# Patient Record
Sex: FEMALE | Race: WHITE | NOT HISPANIC OR LATINO | Employment: PART TIME | URBAN - METROPOLITAN AREA
[De-identification: names, ages, dates, MRNs, and addresses within clinical notes are randomized per-mention and may not be internally consistent; named-entity substitution may affect disease eponyms.]

---

## 2018-07-08 ENCOUNTER — HISTORICAL (OUTPATIENT)
Dept: EMERGENCY MEDICINE | Facility: HOSPITAL | Age: 68
End: 2018-07-08

## 2022-04-28 NOTE — ED PROVIDER NOTES
"   Patient:   Lanette Rankin             MRN: [id number]            FIN: [id number]               Age:   67 years     Sex:  Female     :  1950   Associated Diagnoses:   Esophageal obstruction due to food impaction   Author:   Rubén TREJO, Jakub HART      Basic Information   Time seen: Date & time 2018 12:35:00.   History source: Patient.   Arrival mode: Private vehicle.   History limitation: None.   Additional information: Chief Complaint from Nursing Triage Note : Chief Complaint   2018 12:37 CDT       Chief Complaint           Chest pain and son x 1.5 hours that pt states started while she was eating pancakes and sausage. Pt states she feels better after she vomits and is having difficulty swallowing saliva.  .      History of Present Illness   The patient presents with 66y/o F presents to the ED with complaints of SOB/CP after eating x 1 hour.. States pain is relieved after vomiting. States every time she attempts to drink water it comes back up.  Sheldon fnp-c and     I, Dr. Montana, assumed care of this patient at 1255.  68 y/o C female presents to the ED, c/o SOB and chest pain onset 1.5 hours ago. Pt states she began to have chest pain, SOB, and dysphagia after eating some pancakes and sausage. Pt notes the pain is relieved by forced emesis of phlegm. Pt adds that her "whole family" has a dysphagia problem. .  The onset was 2018 11:00:00 .  The course/duration of symptoms is constant.  Location: Central chest. Radiating pain: none. The character of symptoms is   "pain".  The degree at onset was moderate.  The degree at maximum was moderate.  The degree at present is moderate.  The exacerbating factor is none.  The relieving factor is   forced emesis.  Risk factors consist of none.  Prior episodes: none.  Therapy today None.  Associated symptoms: shortness of breath.        Review of Systems   Constitutional symptoms:  Negative except as documented in HPI.   Skin symptoms:  Negative " except as documented in HPI.   Eye symptoms:  Negative except as documented in HPI.   ENMT symptoms:  Negative except as documented in HPI.   Respiratory symptoms:  Shortness of breath.   Cardiovascular symptoms:  Chest pain, diffuse, moderate pain.    Gastrointestinal symptoms:  Vomiting.   Musculoskeletal symptoms:  Negative except as documented in HPI.   Neurologic symptoms:  Negative except as documented in HPI.   Hematologic/Lymphatic symptoms:  Negative except as documented in HPI.             Additional review of systems information: All other systems reviewed and otherwise negative.      Health Status   Allergies:    No active allergies have been recorded..   Medications:  (Selected)   Inpatient Medications  Ordered  Sodium Chloride 0.9% 1000mL 1,000 mL: 1,000 mL, 1,000 mL, IV, 500 mL/hr, start date 07/08/18 12:58:00 CDT.      Past Medical/ Family/ Social History   Medical history:    No active or resolved past medical history items have been selected or recorded..   Surgical history:    No active procedure history items have been selected or recorded..   Family history:    No family history items have been selected or recorded..   Social history:    Social & Psychosocial Habits    No Data Available  .      Physical Examination               Vital Signs   Vital Signs   7/8/2018 13:01 CDT       SpO2                      98 %    7/8/2018 12:37 CDT       Temperature Oral          36.8 DegC                             Temperature Oral (calculated)             98.24 DegF                             Peripheral Pulse Rate     103 bpm  HI                             Respiratory Rate          18 br/min                             SpO2                      100 %                             Oxygen Therapy            Room air                             Systolic Blood Pressure   195 mmHg  HI                             Diastolic Blood Pressure  98 mmHg  HI  .   Measurements   7/8/2018 12:37 CDT       Weight Dosing              60.5 kg                             Weight Measured and Calculated in Lbs     133.38 lb                             Weight Estimated          60.5 kg                             Height/Length Dosing      162 cm                             Height/Length Estimated   162 cm                             Body Mass Index Estimated 23.05 kg/m2  .   Basic Oxygen Information   7/8/2018 13:01 CDT       SpO2                      98 %    7/8/2018 12:37 CDT       SpO2                      100 %                             Oxygen Therapy            Room air  .   General:  Alert, no acute distress,   appears uncomfortable.    Skin:  Warm, dry.    Head:  Normocephalic, atraumatic.    Neck:  Supple, trachea midline, no JVD, no carotid bruit.    Eye:  Pupils are equal, round and reactive to light, extraocular movements are intact, normal conjunctiva, vision unchanged.    Ears, nose, mouth and throat:  Tympanic membranes clear, oral mucosa moist.    Cardiovascular:  Regular rate and rhythm, No murmur, Normal peripheral perfusion.    Respiratory:  Lungs are clear to auscultation, breath sounds are equal.    Gastrointestinal:  Soft, Nontender, Non distended, Normal bowel sounds,   occasionally spits up saliva/phlegm.    Back:  Nontender, Normal range of motion.    Musculoskeletal:  Normal ROM, normal strength, no tenderness, no swelling.    Neurological:  Alert and oriented to person, place, time, and situation, No focal neurological deficit observed, CN II-XII intact, normal sensory observed, normal motor observed, normal coordination observed, Speech: Normal.    Lymphatics:  No lymphadenopathy.   Psychiatric:  Cooperative, appropriate mood & affect.       Medical Decision Making   Documents reviewed:  Emergency department nurses' notes.   Orders    Laboratory    CBC w/ Auto Master AngeloP-C, Beverley MERINO, 07/08/18, 12:37, Completed    POC Istat ER Troponin, ER, Physician, 07/08/18, 13:15, Completed    Automated DiffMaster  MELISSA Beverley MERINO, 07/08/18, 13:21, Completed    CMP, Master MELISSA, Beverley MERINO, 07/08/18, 12:37, Ordered  Xray    XR Chest 2 Views, Master MELISSA Beverley ANGELIQUE, 07/08/18, 12:37, Ordered  EKG / Respiratory / Ancillary    EKG Adult 12 Lead, Master MELISSABeverley, 07/08/18, 12:37, Completed .   Electrocardiogram:  Time 7/8/2018 12:36:00, rate 81, normal sinus rhythm, No ST-T changes, no ectopy, normal KS & QRS intervals, EP Interp,   Low voltages.    Results review:  Lab results : Lab View   7/8/2018 13:18 CDT       WBC                       7.1 x10(3)/mcL                             RBC                       4.20 x10(6)/mcL                             Hgb                       13.1 gm/dL                             Hct                       39.8 %                             Platelet                  247 x10(3)/mcL                             MCV                       94.8 fL  HI                             MCH                       31.2 pg  HI                             MCHC                      32.9 gm/dL  LOW                             RDW                       12.5 %                             MPV                       8.8 fL  LOW                             Abs Neut                  4.82 x10(3)/mcL                             Neutro Auto               68 %  NA                             Lymph Auto                22 %  NA                             Mono Auto                 8 %  NA                             Eos Auto                  1 %  NA                             Abs Eos                   0.0 x10(3)/mcL                             Basophil Auto             1 %  NA                             Abs Neutro                4.82 x10(3)/mcL                             Abs Lymph                 1.6 x10(3)/mcL                             Abs Mono                  0.6 x10(3)/mcL                             Abs Baso                  0.0 x10(3)/mcL    7/8/2018 12:59 CDT       POC Troponin              0.00 ng/mL   .      Reexamination/ Reevaluation   Patient workup unremarkable, we'll speak with GI about foreign body removal.    1430went to talk to patient about planning she states she feels better, feels like the food is passed, no further retching or shortness of breath.    Patient tolerated by mouth challenge discharge home with outpatient follow-up      Impression and Plan   Diagnosis   Esophageal obstruction due to food impaction (ACU28-EO K22.2)   Plan   Condition: Improved.    Disposition: Discharged: Time  7/8/2018 14:55:00, to home.    Patient was given the following educational materials: Esophageal Stricture.    Follow up with: [facility] Within 2 to 4 weeks For upper endoscopy; Report to Emergency Department if symptoms return or worsen.    Counseled: Patient, Regarding diagnosis, Regarding diagnostic results, Regarding treatment plan, Patient indicated understanding of instructions.    Notes: I, Jakub Holguin, acted solely as a scribe for and in the presence of Dr. Montana who performed the service., I  personally performed all of the above services as recorded in this chart.

## 2023-06-05 ENCOUNTER — HOSPITAL ENCOUNTER (EMERGENCY)
Facility: HOSPITAL | Age: 73
Discharge: HOME | End: 2023-06-05
Attending: EMERGENCY MEDICINE
Payer: MEDICARE

## 2023-06-05 VITALS
DIASTOLIC BLOOD PRESSURE: 69 MMHG | HEIGHT: 63 IN | BODY MASS INDEX: 23.04 KG/M2 | SYSTOLIC BLOOD PRESSURE: 162 MMHG | TEMPERATURE: 97 F | HEART RATE: 81 BPM | WEIGHT: 130 LBS | RESPIRATION RATE: 18 BRPM | OXYGEN SATURATION: 100 %

## 2023-06-05 DIAGNOSIS — K57.32 SIGMOID DIVERTICULITIS: ICD-10-CM

## 2023-06-05 DIAGNOSIS — K40.90 RIGHT INGUINAL HERNIA: Primary | ICD-10-CM

## 2023-06-05 DIAGNOSIS — R03.0 ELEVATED BLOOD-PRESSURE READING WITHOUT DIAGNOSIS OF HYPERTENSION: ICD-10-CM

## 2023-06-05 LAB
ALBUMIN SERPL-MCNC: 4 G/DL (ref 3.4–4.8)
ALBUMIN/GLOB SERPL: 1.2 RATIO (ref 1.1–2)
ALP SERPL-CCNC: 80 UNIT/L (ref 40–150)
ALT SERPL-CCNC: 9 UNIT/L (ref 0–55)
APPEARANCE UR: CLEAR
AST SERPL-CCNC: 23 UNIT/L (ref 5–34)
BACTERIA #/AREA URNS AUTO: NORMAL /HPF
BASOPHILS # BLD AUTO: 0.03 X10(3)/MCL
BASOPHILS NFR BLD AUTO: 0.2 %
BILIRUB UR QL STRIP.AUTO: NEGATIVE MG/DL
BILIRUBIN DIRECT+TOT PNL SERPL-MCNC: 0.6 MG/DL
BUN SERPL-MCNC: 17.7 MG/DL (ref 9.8–20.1)
CALCIUM SERPL-MCNC: 8.9 MG/DL (ref 8.4–10.2)
CHLORIDE SERPL-SCNC: 109 MMOL/L (ref 98–107)
CO2 SERPL-SCNC: 21 MMOL/L (ref 23–31)
COLOR UR: YELLOW
CREAT SERPL-MCNC: 0.72 MG/DL (ref 0.55–1.02)
EOSINOPHIL # BLD AUTO: 0.06 X10(3)/MCL (ref 0–0.9)
EOSINOPHIL NFR BLD AUTO: 0.5 %
ERYTHROCYTE [DISTWIDTH] IN BLOOD BY AUTOMATED COUNT: 12.6 % (ref 11.5–17)
GFR SERPLBLD CREATININE-BSD FMLA CKD-EPI: >60 MLS/MIN/1.73/M2
GLOBULIN SER-MCNC: 3.4 GM/DL (ref 2.4–3.5)
GLUCOSE SERPL-MCNC: 110 MG/DL (ref 82–115)
GLUCOSE UR QL STRIP.AUTO: NEGATIVE MG/DL
HCT VFR BLD AUTO: 38.2 % (ref 37–47)
HGB BLD-MCNC: 12.7 G/DL (ref 12–16)
IMM GRANULOCYTES # BLD AUTO: 0.06 X10(3)/MCL (ref 0–0.04)
IMM GRANULOCYTES NFR BLD AUTO: 0.5 %
KETONES UR QL STRIP.AUTO: ABNORMAL MG/DL
LEUKOCYTE ESTERASE UR QL STRIP.AUTO: NEGATIVE UNIT/L
LIPASE SERPL-CCNC: 22 U/L
LYMPHOCYTES # BLD AUTO: 1.49 X10(3)/MCL (ref 0.6–4.6)
LYMPHOCYTES NFR BLD AUTO: 12.2 %
MCH RBC QN AUTO: 31.5 PG (ref 27–31)
MCHC RBC AUTO-ENTMCNC: 33.2 G/DL (ref 33–36)
MCV RBC AUTO: 94.8 FL (ref 80–94)
MONOCYTES # BLD AUTO: 0.95 X10(3)/MCL (ref 0.1–1.3)
MONOCYTES NFR BLD AUTO: 7.8 %
NEUTROPHILS # BLD AUTO: 9.61 X10(3)/MCL (ref 2.1–9.2)
NEUTROPHILS NFR BLD AUTO: 78.8 %
NITRITE UR QL STRIP.AUTO: NEGATIVE
NRBC BLD AUTO-RTO: 0 %
PH UR STRIP.AUTO: 6 [PH]
PLATELET # BLD AUTO: 299 X10(3)/MCL (ref 130–400)
PMV BLD AUTO: 9 FL (ref 7.4–10.4)
POTASSIUM SERPL-SCNC: 3.9 MMOL/L (ref 3.5–5.1)
PROT SERPL-MCNC: 7.4 GM/DL (ref 5.8–7.6)
PROT UR QL STRIP.AUTO: NEGATIVE MG/DL
RBC # BLD AUTO: 4.03 X10(6)/MCL (ref 4.2–5.4)
RBC #/AREA URNS AUTO: <5 /HPF
RBC UR QL AUTO: ABNORMAL UNIT/L
SODIUM SERPL-SCNC: 139 MMOL/L (ref 136–145)
SP GR UR STRIP.AUTO: 1.02 (ref 1–1.03)
SQUAMOUS #/AREA URNS AUTO: <5 /HPF
UROBILINOGEN UR STRIP-ACNC: 0.2 MG/DL
WBC # SPEC AUTO: 12.2 X10(3)/MCL (ref 4.5–11.5)
WBC #/AREA URNS AUTO: <5 /HPF

## 2023-06-05 PROCEDURE — 99285 EMERGENCY DEPT VISIT HI MDM: CPT | Mod: 25

## 2023-06-05 PROCEDURE — 81001 URINALYSIS AUTO W/SCOPE: CPT | Performed by: EMERGENCY MEDICINE

## 2023-06-05 PROCEDURE — 83690 ASSAY OF LIPASE: CPT | Performed by: EMERGENCY MEDICINE

## 2023-06-05 PROCEDURE — 80053 COMPREHEN METABOLIC PANEL: CPT | Performed by: EMERGENCY MEDICINE

## 2023-06-05 PROCEDURE — 85025 COMPLETE CBC W/AUTO DIFF WBC: CPT | Performed by: EMERGENCY MEDICINE

## 2023-06-05 PROCEDURE — 25500020 PHARM REV CODE 255: Performed by: EMERGENCY MEDICINE

## 2023-06-05 RX ORDER — NAPROXEN 500 MG/1
500 TABLET ORAL 2 TIMES DAILY PRN
Qty: 20 TABLET | Refills: 0 | Status: SHIPPED | OUTPATIENT
Start: 2023-06-05 | End: 2023-07-06

## 2023-06-05 RX ORDER — AMOXICILLIN AND CLAVULANATE POTASSIUM 875; 125 MG/1; MG/1
1 TABLET, FILM COATED ORAL 2 TIMES DAILY
Qty: 20 TABLET | Refills: 0 | Status: SHIPPED | OUTPATIENT
Start: 2023-06-05 | End: 2023-06-15

## 2023-06-05 RX ADMIN — IOPAMIDOL 100 ML: 755 INJECTION, SOLUTION INTRAVENOUS at 10:06

## 2023-06-05 NOTE — DISCHARGE INSTRUCTIONS
Call Dr. Bailey's office in the morning. Let them know you were in the ER, the doctor spoke with Dr. Bailey about following up and a referral was placed.  He said they would get you in urgently for a follow up in a few weeks.  I also placed a referral to a surgeon, call their office in the next day or so and say you were in the er and a hernia was found and they placed a referral to surgery for you.  In the meantime, please establish with a primary care provider of your choice for routine outpatient care

## 2023-06-05 NOTE — ED PROVIDER NOTES
"Encounter Date: 6/5/2023    SCRIBE #1 NOTE: I, Vicky Ocampo, am scribing for, and in the presence of,  Milly Barron MD. I have scribed the following portions of the note - Other sections scribed: HPI, ROS, PE.     History     Chief Complaint   Patient presents with    Abdominal Pain     Pt. Reports "a lump" R lower abdomen x 1 years, c/o lower abdominal pain radiating down R leg since yesterday .     72 year old female presents to ED complaining of abdominal pain.  Pt says that she has had a painless mass on her RLQ for about a year.  She says that last night she had nausea, abdominal pain and cramping, and fever.  She says that she cannot walk normally, because of pain that shoots down her right leg.  She denies any recent falls and says she does not take any medication.  She says her bowel movements and urination have been normal.    The history is provided by the patient. No  was used.   Abdominal Pain  The current episode started yesterday. The onset of the illness was abrupt. The problem has not changed since onset.The abdominal pain is located in the RLQ. The abdominal pain radiates to the right leg. The other symptoms of the illness include nausea. The other symptoms of the illness do not include fever, fatigue, shortness of breath, vomiting, diarrhea or dysuria.   Symptoms associated with the illness do not include constipation or back pain.   Review of patient's allergies indicates:   Allergen Reactions    Sulfa (sulfonamide antibiotics)      "I dont know my Mom told me"     No past medical history on file.  No past surgical history on file.  No family history on file.     Review of Systems   Constitutional:  Negative for fatigue, fever and unexpected weight change.   HENT:  Negative for congestion and rhinorrhea.    Eyes:  Negative for pain.   Respiratory:  Negative for chest tightness, shortness of breath and wheezing.    Cardiovascular:  Negative for chest pain. "   Gastrointestinal:  Positive for abdominal pain and nausea. Negative for constipation, diarrhea and vomiting.   Genitourinary:  Negative for dysuria.   Musculoskeletal:  Negative for back pain and neck pain.   Skin:  Negative for rash.   Allergic/Immunologic: Negative for environmental allergies, food allergies and immunocompromised state.   Neurological:  Negative for dizziness and speech difficulty.   Hematological:  Does not bruise/bleed easily.   Psychiatric/Behavioral:  Negative for sleep disturbance and suicidal ideas.      Physical Exam     Initial Vitals [06/05/23 0829]   BP Pulse Resp Temp SpO2   (!) 161/89 96 20 96.8 °F (36 °C) 96 %      MAP       --         Physical Exam    Nursing note and vitals reviewed.  Constitutional: She appears well-developed and well-nourished. She is not diaphoretic. No distress.   HENT:   Head: Normocephalic and atraumatic.   Nose: Nose normal.   Mouth/Throat: Oropharynx is clear and moist.   Eyes: Conjunctivae and EOM are normal. Pupils are equal, round, and reactive to light.   Neck: Trachea normal. Neck supple.   Normal range of motion.  Cardiovascular:  Normal rate, regular rhythm, normal heart sounds and intact distal pulses.           No murmur heard.  Pulmonary/Chest: Breath sounds normal. No respiratory distress. She has no wheezes. She has no rhonchi. She has no rales. She exhibits no tenderness.   Abdominal: Abdomen is soft. Bowel sounds are normal. She exhibits no distension. There is no abdominal tenderness.   Rounded non tender mass to her right lower abdomen that is only palpable when she is standing. There is no rebound and no guarding.   Musculoskeletal:         General: No tenderness or edema. Normal range of motion.      Cervical back: Normal range of motion and neck supple.      Lumbar back: Normal. Normal range of motion.     Neurological: She is alert and oriented to person, place, and time. She has normal strength. No cranial nerve deficit or sensory  deficit.   Skin: Skin is warm and dry. Capillary refill takes less than 2 seconds. No abscess noted. No erythema. No pallor.   Psychiatric: She has a normal mood and affect. Her behavior is normal. Judgment and thought content normal.       ED Course   Procedures  Labs Reviewed   COMPREHENSIVE METABOLIC PANEL - Abnormal; Notable for the following components:       Result Value    Chloride 109 (*)     Carbon Dioxide 21 (*)     All other components within normal limits   URINALYSIS, REFLEX TO URINE CULTURE - Abnormal; Notable for the following components:    Ketones, UA 1+ (*)     Blood, UA Trace (*)     All other components within normal limits   CBC WITH DIFFERENTIAL - Abnormal; Notable for the following components:    WBC 12.20 (*)     RBC 4.03 (*)     MCV 94.8 (*)     MCH 31.5 (*)     Neut # 9.61 (*)     IG# 0.06 (*)     All other components within normal limits   LIPASE - Normal   URINALYSIS, MICROSCOPIC - Normal   CBC W/ AUTO DIFFERENTIAL    Narrative:     The following orders were created for panel order CBC W/ AUTO DIFFERENTIAL.  Procedure                               Abnormality         Status                     ---------                               -----------         ------                     CBC with Differential[[id number]]        Abnormal            Final result                 Please view results for these tests on the individual orders.          Imaging Results              US Abdomen Limited (Final result)  Result time 06/05/23 15:34:28      Final result by Meche Doan MD (06/05/23 15:34:28)                   Impression:      Large cyst at the right lobe of the liver superiorly.  Smaller lesions seen on CT exam not appreciable on this sonogram.    Nonobstructing right renal calculus.      Electronically signed by: Meche Doan  Date:    06/05/2023  Time:    15:34               Narrative:    EXAMINATION:  US ABDOMEN LIMITED    CLINICAL HISTORY:  liver cysts on CT, us  recommended;    TECHNIQUE:  Limited ultrasound of the right upper quadrant of the abdomen was performed.    COMPARISON:  Prior CT abdomen pelvis 06/05/2023    FINDINGS:  LIVER: 14 cm cranial caudal at the midclavicular line. Coarsened echotexture.  Cyst near the dome of the liver corresponding 6 cm cyst seen on CT.  Smaller lesion seen on CT not appreciable on this sonogram.  Portal vein is patent with appropriate directional flow.    PANCREAS: The visualized portions of pancreas appear normal.    GALLBLADDER: No shadowing calculi, wall thickening, or pericholecystic fluid.    BILE DUCTS: 3 mm common bile duct.  Distal common bile duct is obscured by shadowing bowel gas.    INFERIOR VENA CAVA: Normal in appearance.    RIGHT KIDNEY: 9.6 cm. No hydronephrosis.  Echogenic spectral reflector at the mid right kidney measures 3 mm compatible with nonobstructing calculus.    OTHER: No ascites.                                        CT Abdomen Pelvis With Contrast (Final result)  Result time 06/05/23 11:10:15      Final result by Thomas Contreras MD (06/05/23 11:10:15)                   Impression:      Wall thickening with associated inflammatory changes seen in the sigmoid colon concerning for acute diverticulitis.  Underlying colonic mass cannot be completely excluded and follow-up is recommended to resolution.  Some associated punctate subcentimeter lymphadenopathy seen adjacent to the sigmoid.    Right inguinal hernia containing a loop of small bowel but no obstruction is seen    Hypoattenuated areas in the liver most consistent with cysts.  The largest lesion in the dome of the liver appears to have a slightly thickened wall on some of the images.  Additional correlation with ultrasound or CT scan liver mass protocol is recommended    Hiatal hernia    This report was flagged in Epic as abnormal.      Electronically signed by: Tohmas Contreras  Date:    06/05/2023  Time:    11:10               Narrative:     EXAMINATION:  CT ABDOMEN PELVIS WITH CONTRAST    CLINICAL HISTORY:  RLQ abdominal pain (Age >= 14y);RLQ pain and a right lower quadrant mass/lump;    TECHNIQUE:  Low dose axial images, sagittal and coronal reformations were obtained from the lung bases to the pubic symphysis following the IV administration of contrast. Automatic exposure control (AEC) is utilized to reduce patient radiation exposure.    COMPARISON:  None.    FINDINGS:  The lung bases are clear.    The liver is normal in size.  There are multiple hypoattenuated areas seen scattered in the liver which likely represent cysts.  The largest area seen in the dome of the liver but is seems to have some associated peripheral wall thickening especially as seen on images number 14 and 13 on series 2 and image 32 on series 6.  CT scan liver mass protocol may be beneficial for correlation.  Ultrasound could also be performed for correlation.  Portal and hepatic veins appear normal.    There is a hiatal hernia.    The gallbladder appears normal.  No obvious gallstones are seen.  No biliary dilatation is seen.  No pericholecystic fluid is seen.    The pancreas appears normal.  No pancreatic mass or lesion is seen.    The spleen shows no acute abnormality.    The adrenal glands appear normal.  No adrenal nodule is seen.    The kidneys appear normal.  No hydronephrosis is seen.  No hydroureter is seen.  No nephrolithiasis is seen.  No obvious ureteral stones are seen.    Urinary bladder appears grossly unremarkable.    There is some colonic wall thickening with some associated inflammatory changes in the sigmoid colon.  Findings are concerning for diverticulitis.  Underlying mass cannot be excluded.  There are some punctate lymph nodes seen associated with the abnormality in the sigmoid colon.    There is an inguinal hernia on the right side containing a loop of small bowel.  No obvious obstruction is seen.  The appendix is seen and appears unremarkable..    No  free air is seen.  No free fluid is seen.                                       Medications   iopamidoL (ISOVUE-370) injection 100 mL (100 mLs Intravenous Given 6/5/23 1056)     Medical Decision Making:   Initial Assessment:   See hpi  Clinical Tests:   Lab Tests: Ordered and Reviewed  The following lab test(s) were unremarkable: CMP and Urinalysis  Radiological Study: Ordered and Reviewed  Other:   I have discussed this case with another health care provider.        Scribe Attestation:   Scribe #1: I performed the above scribed service and the documentation accurately describes the services I performed. I attest to the accuracy of the note.  Comments: Attending:   Physician Attestation Statement for Scribe #1: IMilly MD, personally performed the services described in this documentation. All medical record entries made by the scribe were at my direction and in my presence.  I have reviewed the chart and agree that the record reflects my personal performance and is accurate and complete.        Attending Attestation:           Physician Attestation for Scribe:  Physician Attestation Statement for Scribe #1: IMilly MD, reviewed documentation, as scribed by Vicky Ocampo in my presence, and it is both accurate and complete.       Medical Decision Making  Differential diagnosis includes, but is not limited to: cyst, mass, abscess, appendicitis, UTI  CBC, CMP, urinalysis and CT scan ordered and reviewed.  Ultrasound also ordered and reviewed.  Labs only notable for mild leukocytosis.  CT with an inguinal hernia but was easily reducible on exam and also some diverticulitis but could not rule out an underlying mass lesion.  She was not had any routine or preventative care therefore I discussed the case with GI to arrange closer follow up.  She did not require any analgesia as she only has pain with ambulation.  The leg pain sounds most like some musculoskeletal type pain.  I placed a  referral for outpatient follow-up for General surgery and strongly encouraged her to establish with a primary care provider.  I also prescribed naproxen and Augmentin.  Discussed strict return precautions and she voiced understanding and agrees is comfortable with plan    Problems Addressed:  Elevated blood-pressure reading without diagnosis of hypertension: undiagnosed new problem with uncertain prognosis  Right inguinal hernia: acute illness or injury  Sigmoid diverticulitis: acute illness or injury that poses a threat to life or bodily functions    Amount and/or Complexity of Data Reviewed  Labs: ordered.  Radiology: ordered.    Risk  OTC drugs.  Prescription drug management.           ED Course as of 06/05/23 1856   Mon Jun 05, 2023   1137 Calling GI to try to arrange follow up [BS]   1148 I updated patient on results and importance of outpatient f/u [BS]   1329 Abd us to evaluate the liver lesions that are likely cysts while awaiting yosvany back from gi [BS]   1353 Discussed with Dr. Bailey, will make sure she is able to follow up closely [BS]   1359 Patient is comfortable with this plan [BS]      ED Course User Index  [BS] Milly Barron MD                 Clinical Impression:   Final diagnoses:  [K40.90] Right inguinal hernia (Primary)  [K57.32] Sigmoid diverticulitis  [R03.0] Elevated blood-pressure reading without diagnosis of hypertension        ED Disposition Condition    Discharge Stable          ED Prescriptions       Medication Sig Dispense Start Date End Date Auth. Provider    amoxicillin-clavulanate 875-125mg (AUGMENTIN) 875-125 mg per tablet Take 1 tablet by mouth 2 (two) times daily. for 10 days 20 tablet 6/5/2023 6/15/2023 Milly Barron MD    naproxen (NAPROSYN) 500 MG tablet Take 1 tablet (500 mg total) by mouth 2 (two) times daily as needed (take with food or milk every 12 hours as needed for pain). 20 tablet 6/5/2023 -- Milly Barron MD          Follow-up Information       Follow up  With Specialties Details Why Contact Info    Franki Schilling MD General Surgery Schedule an appointment as soon as possible for a visit   [address]   [address]  [place] [address]  [phone]      Joe Bailey MD Gastroenterology Schedule an appointment as soon as possible for a visit   [address]  [address]  [place] [address]  [phone]      Primary care provider    I strongly encourage you to establish with a primary care provider. you can call the provider of your choice or you can call [phone] to establish care    [facility] - Emergency Dept Emergency Medicine  As needed, If symptoms worsen [address] [facility] [id number] [phone]             Milly Barron MD  06/05/23 4117

## 2023-06-21 ENCOUNTER — HOSPITAL ENCOUNTER (EMERGENCY)
Facility: HOSPITAL | Age: 73
Discharge: HOME | End: 2023-06-21
Attending: EMERGENCY MEDICINE
Payer: MEDICARE

## 2023-06-21 VITALS
OXYGEN SATURATION: 95 % | WEIGHT: 140 LBS | HEART RATE: 96 BPM | HEIGHT: 65 IN | DIASTOLIC BLOOD PRESSURE: 77 MMHG | TEMPERATURE: 99 F | SYSTOLIC BLOOD PRESSURE: 179 MMHG | BODY MASS INDEX: 23.32 KG/M2 | RESPIRATION RATE: 18 BRPM

## 2023-06-21 DIAGNOSIS — K40.90 RIGHT INGUINAL HERNIA: Primary | ICD-10-CM

## 2023-06-21 LAB
ALBUMIN SERPL-MCNC: 4.2 G/DL (ref 3.4–4.8)
ALBUMIN/GLOB SERPL: 1.2 RATIO (ref 1.1–2)
ALP SERPL-CCNC: 71 UNIT/L (ref 40–150)
ALT SERPL-CCNC: 16 UNIT/L (ref 0–55)
APPEARANCE UR: ABNORMAL
AST SERPL-CCNC: 26 UNIT/L (ref 5–34)
BACTERIA #/AREA URNS AUTO: ABNORMAL /HPF
BASOPHILS # BLD AUTO: 0.03 X10(3)/MCL
BASOPHILS NFR BLD AUTO: 0.2 %
BILIRUB UR QL STRIP.AUTO: NEGATIVE MG/DL
BILIRUBIN DIRECT+TOT PNL SERPL-MCNC: 0.5 MG/DL
BUN SERPL-MCNC: 14.9 MG/DL (ref 9.8–20.1)
CALCIUM SERPL-MCNC: 9.2 MG/DL (ref 8.4–10.2)
CAOX CRY URNS QL MICRO: ABNORMAL /HPF
CHLORIDE SERPL-SCNC: 109 MMOL/L (ref 98–107)
CO2 SERPL-SCNC: 21 MMOL/L (ref 23–31)
COLOR UR: ABNORMAL
CREAT SERPL-MCNC: 0.8 MG/DL (ref 0.55–1.02)
EOSINOPHIL # BLD AUTO: 0.01 X10(3)/MCL (ref 0–0.9)
EOSINOPHIL NFR BLD AUTO: 0.1 %
ERYTHROCYTE [DISTWIDTH] IN BLOOD BY AUTOMATED COUNT: 13.2 % (ref 11.5–17)
GFR SERPLBLD CREATININE-BSD FMLA CKD-EPI: >60 MLS/MIN/1.73/M2
GLOBULIN SER-MCNC: 3.5 GM/DL (ref 2.4–3.5)
GLUCOSE SERPL-MCNC: 112 MG/DL (ref 82–115)
GLUCOSE UR QL STRIP.AUTO: NEGATIVE MG/DL
HCT VFR BLD AUTO: 41.5 % (ref 37–47)
HGB BLD-MCNC: 13.2 G/DL (ref 12–16)
IMM GRANULOCYTES # BLD AUTO: 0.05 X10(3)/MCL (ref 0–0.04)
IMM GRANULOCYTES NFR BLD AUTO: 0.3 %
KETONES UR QL STRIP.AUTO: ABNORMAL MG/DL
LEUKOCYTE ESTERASE UR QL STRIP.AUTO: ABNORMAL UNIT/L
LYMPHOCYTES # BLD AUTO: 0.54 X10(3)/MCL (ref 0.6–4.6)
LYMPHOCYTES NFR BLD AUTO: 3.7 %
MCH RBC QN AUTO: 31.5 PG (ref 27–31)
MCHC RBC AUTO-ENTMCNC: 31.8 G/DL (ref 33–36)
MCV RBC AUTO: 99 FL (ref 80–94)
MONOCYTES # BLD AUTO: 0.79 X10(3)/MCL (ref 0.1–1.3)
MONOCYTES NFR BLD AUTO: 5.3 %
MUCOUS THREADS URNS QL MICRO: ABNORMAL /LPF
NEUTROPHILS # BLD AUTO: 13.35 X10(3)/MCL (ref 2.1–9.2)
NEUTROPHILS NFR BLD AUTO: 90.4 %
NITRITE UR QL STRIP.AUTO: NEGATIVE
NRBC BLD AUTO-RTO: 0 %
PH UR STRIP.AUTO: 6 [PH]
PLATELET # BLD AUTO: 247 X10(3)/MCL (ref 130–400)
PMV BLD AUTO: 9.2 FL (ref 7.4–10.4)
POTASSIUM SERPL-SCNC: 3.8 MMOL/L (ref 3.5–5.1)
PROT SERPL-MCNC: 7.7 GM/DL (ref 5.8–7.6)
PROT UR QL STRIP.AUTO: ABNORMAL MG/DL
RBC # BLD AUTO: 4.19 X10(6)/MCL (ref 4.2–5.4)
RBC #/AREA URNS AUTO: <5 /HPF
RBC UR QL AUTO: NEGATIVE UNIT/L
SODIUM SERPL-SCNC: 141 MMOL/L (ref 136–145)
SP GR UR STRIP.AUTO: 1.03 (ref 1–1.03)
SQUAMOUS #/AREA URNS AUTO: 9 /HPF
UROBILINOGEN UR STRIP-ACNC: 1 MG/DL
WBC # SPEC AUTO: 14.77 X10(3)/MCL (ref 4.5–11.5)
WBC #/AREA URNS AUTO: 10 /HPF

## 2023-06-21 PROCEDURE — 80053 COMPREHEN METABOLIC PANEL: CPT | Performed by: PHYSICIAN ASSISTANT

## 2023-06-21 PROCEDURE — 25500020 PHARM REV CODE 255: Performed by: NURSE PRACTITIONER

## 2023-06-21 PROCEDURE — 81001 URINALYSIS AUTO W/SCOPE: CPT | Performed by: PHYSICIAN ASSISTANT

## 2023-06-21 PROCEDURE — 99285 EMERGENCY DEPT VISIT HI MDM: CPT | Mod: 25

## 2023-06-21 PROCEDURE — 85025 COMPLETE CBC W/AUTO DIFF WBC: CPT | Performed by: PHYSICIAN ASSISTANT

## 2023-06-21 PROCEDURE — 63600175 PHARM REV CODE 636 W HCPCS: Performed by: NURSE PRACTITIONER

## 2023-06-21 PROCEDURE — 96374 THER/PROPH/DIAG INJ IV PUSH: CPT

## 2023-06-21 RX ORDER — ONDANSETRON 4 MG/1
4 TABLET, FILM COATED ORAL EVERY 6 HOURS
Qty: 12 TABLET | Refills: 0 | OUTPATIENT
Start: 2023-06-21 | End: 2023-06-22

## 2023-06-21 RX ORDER — HYDRALAZINE HYDROCHLORIDE 20 MG/ML
10 INJECTION INTRAMUSCULAR; INTRAVENOUS
Status: DISCONTINUED | OUTPATIENT
Start: 2023-06-21 | End: 2023-06-21

## 2023-06-21 RX ORDER — ONDANSETRON 2 MG/ML
4 INJECTION INTRAMUSCULAR; INTRAVENOUS
Status: COMPLETED | OUTPATIENT
Start: 2023-06-21 | End: 2023-06-21

## 2023-06-21 RX ADMIN — IOPAMIDOL 100 ML: 755 INJECTION, SOLUTION INTRAVENOUS at 07:06

## 2023-06-21 RX ADMIN — ONDANSETRON 4 MG: 2 INJECTION INTRAMUSCULAR; INTRAVENOUS at 07:06

## 2023-06-21 NOTE — ED PROVIDER NOTES
"Encounter Date: 6/21/2023       History     Chief Complaint   Patient presents with    Abdominal Pain     Pt reports she was dx with hernia to rlq two days ago and began having pain and hot flashes today. Pt states she passed out from the pain. AAOx4. States has apt with surgeon on friday     See MDM    The history is provided by the patient. No  was used.   Review of patient's allergies indicates:   Allergen Reactions    Sulfa (sulfonamide antibiotics)      "I dont know my Mom told me"     Past Medical History:   Diagnosis Date    Known health problems: none      Past Surgical History:   Procedure Laterality Date    none       History reviewed. No pertinent family history.  Social History     Tobacco Use    Smoking status: Never    Smokeless tobacco: Never   Substance Use Topics    Alcohol use: Not Currently    Drug use: Not Currently     Review of Systems   Constitutional:  Negative for fever.   Respiratory:  Negative for cough and shortness of breath.    Cardiovascular:  Negative for chest pain.   Gastrointestinal:  Positive for abdominal pain.   Genitourinary:  Negative for difficulty urinating and dysuria.   Musculoskeletal:  Negative for gait problem.   Skin:  Negative for color change.   Neurological:  Negative for dizziness, speech difficulty and headaches.   Psychiatric/Behavioral:  Negative for hallucinations and suicidal ideas.    All other systems reviewed and are negative.    Physical Exam     Initial Vitals [06/21/23 1514]   BP Pulse Resp Temp SpO2   (!) 178/78 90 18 98.8 °F (37.1 °C) 96 %      MAP       --         Physical Exam    Nursing note and vitals reviewed.  Constitutional: She appears well-developed and well-nourished.   HENT:   Head: Normocephalic.   Eyes: EOM are normal.   Neck:   Normal range of motion.  Cardiovascular:  Normal rate, regular rhythm, normal heart sounds and intact distal pulses.           Pulmonary/Chest: Breath sounds normal. No respiratory distress. "   Abdominal: Abdomen is soft. Bowel sounds are normal. There is no abdominal tenderness.   Hernia noted to RLQ that is reducible. No pain or tenderness at present time.    Musculoskeletal:         General: Normal range of motion.      Cervical back: Normal range of motion.     Neurological: She is alert and oriented to person, place, and time. She has normal strength.   Skin: Skin is warm and dry.   Psychiatric: She has a normal mood and affect. Her behavior is normal. Judgment and thought content normal.       ED Course   Procedures  Labs Reviewed   COMPREHENSIVE METABOLIC PANEL - Abnormal; Notable for the following components:       Result Value    Chloride 109 (*)     Carbon Dioxide 21 (*)     Protein Total 7.7 (*)     All other components within normal limits   URINALYSIS, REFLEX TO URINE CULTURE - Abnormal; Notable for the following components:    Appearance, UA Cloudy (*)     Specific Gravity, UA 1.033 (*)     Protein, UA Trace (*)     Ketones, UA Trace (*)     Leukocyte Esterase, UA 1+ (*)     All other components within normal limits   CBC WITH DIFFERENTIAL - Abnormal; Notable for the following components:    WBC 14.77 (*)     RBC 4.19 (*)     MCV 99.0 (*)     MCH 31.5 (*)     MCHC 31.8 (*)     Lymph # 0.54 (*)     Neut # 13.35 (*)     IG# 0.05 (*)     All other components within normal limits   URINALYSIS, MICROSCOPIC - Abnormal; Notable for the following components:    WBC, UA 10 (*)     Squamous Epithelial Cells, UA 9 (*)     Mucous, UA Large (*)     Calcium Oxalate Crystals, UA Moderate (*)     All other components within normal limits   CBC W/ AUTO DIFFERENTIAL    Narrative:     The following orders were created for panel order CBC auto differential.  Procedure                               Abnormality         Status                     ---------                               -----------         ------                     CBC with Differential[[id number]]        Abnormal            Final result                  Please view results for these tests on the individual orders.          Imaging Results              CT Abdomen Pelvis With Contrast (Final result)  Result time 06/21/23 20:05:31      Final result by Hari Oshea MD (06/21/23 20:05:31)                   Impression:      No acute abnormality identified within the abdomen and pelvis.  Small right inguinal hernia containing bowel with no evidence of obstruction.  This is slightly more bowel in previous.      Electronically signed by: aHri Oshea  Date:    06/21/2023  Time:    20:05               Narrative:    EXAMINATION:  CT ABDOMEN PELVIS WITH CONTRAST    CLINICAL HISTORY:  RLQ abdominal pain (Age >= 14y);    TECHNIQUE:  Multidetector IV contrast enhanced axial CT images of the abdomen and pelvis were obtained with coronal and sagittal reconstructions.    Automatic exposure control was utilized to reduce the patient's radiation dose.    DLP= 322    COMPARISON:  06/05/2023    FINDINGS:  01. HEPATOBILIARY: Cystic lesions within the liver appear grossly unchanged.  The gallbladder is unremarkable.    02. SPLEEN: Normal    03. PANCREAS: No focal masses or ductal dilatation.    04. ADRENALS: No adrenal nodules.    05. KIDNEYS: The right kidney demonstrates no stone, hydronephrosis, or hydroureter. No focal mass identified. The left kidney demonstrates no stone, hydronephrosis, or hydroureter. No focal mass identified.    06. LYMPHADENOPATHY/RETROPERITONEUM: There is no retroperitoneal lymphadenopathy. The abdominal aorta is normal in course and caliber. There are diffuse scattered mural atheromatous calcifications in the aortoiliac system.    07. BOWEL: No acute bowel related abnormalities. Interval resolution of prior diverticulitis.    08. PELVIC VISCERA: Normal. No pelvic mass.    09. PELVIC LYMPH NODES: No lymphadenopathy.    10. PERITONEUM/ABDOMINAL WALL: No ascites or implant.  Small right-sided inguinal hernia containing bowel with no evidence of  obstruction.    11. SKELETAL: No aggressive appearing lytic/blastic lesion. No acute fractures, subluxations or dislocations.    12. LUNG BASES: The visualized lungs are unremarkable.                                       Medications   ondansetron injection 4 mg (4 mg Intravenous Given 6/21/23 1922)   iopamidoL (ISOVUE-370) injection 100 mL (100 mLs Intravenous Given 6/21/23 1955)     Medical Decision Making:   Initial Assessment:   Historian:  Patient.  Patient is a 72-year-old female  that presents with right lower quadrant hernia with pain that has been present a few days. Associated symptoms nothing. Surrounding information is patient is pain-free at present time. Exacerbated by nothing. Relieved by nothing. Patient treatment prior to arrival none. Risk factors include none. Other history pertaining to this complaint nothing.   Assessment:  See physical exam.    Differential Diagnosis:   Inguinal hernia, diverticulitis  ED Management:  History was obtained.  Physical was performed.  Lab studies unremarkable for elevated white blood cell count.  I did speak to Dr. Weston.  CT scan was ordered.  This showed a inguinal hernia without obstruction.  Patient's blood pressure was elevated but it seems to be improving without treatment.  Patient states she does not want to take any blood pressure medication and she will follow up with her PCP to recheck.  No medical or surgical consult indicated ER.  No social determinants that affect healthcare were noted.                        Clinical Impression:   Final diagnoses:  [K40.90] Right inguinal hernia (Primary)        ED Disposition Condition    Discharge Stable          ED Prescriptions    None       Follow-up Information       Follow up With Specialties Details Why Contact Info    Your Primary Care Provider  Call in 3 days ed follow up              DONNA Almaraz  06/21/23 2024       DONNA Almaraz  06/21/23 2028

## 2023-06-21 NOTE — FIRST PROVIDER EVALUATION
"Medical screening examination initiated.  I have conducted a focused provider triage encounter, findings are as follows:    Chief Complaint   Patient presents with    Abdominal Pain     Pt reports she was dx with hernia to rlq two days ago and began having pain and hot flashes today. Pt states she passed out from the pain. AAOx4. States has apt with surgeon on friday     Brief history of present illness:  72 y.o. female presents to the ED with RLQ pain for the last 2 days ago. Recently diagnosed with hernia; appt with surgery on Friday.     Vitals:    06/21/23 1514   BP: (!) 178/78   Pulse: 90   Resp: 18   Temp: 98.8 °F (37.1 °C)   SpO2: 96%   Weight: 63.5 kg (140 lb)   Height: 5' 5" (1.651 m)       Pertinent physical exam:  Awake, alert, ambulatory, non-labored respirations      Brief workup plan:  labs, UA    Preliminary workup initiated; this workup will be continued and followed by the physician or advanced practice provider that is assigned to the patient when roomed.  "

## 2023-06-22 ENCOUNTER — HOSPITAL ENCOUNTER (EMERGENCY)
Facility: HOSPITAL | Age: 73
Discharge: HOME | End: 2023-06-22
Attending: EMERGENCY MEDICINE
Payer: MEDICARE

## 2023-06-22 VITALS
OXYGEN SATURATION: 95 % | HEIGHT: 63 IN | RESPIRATION RATE: 21 BRPM | TEMPERATURE: 100 F | DIASTOLIC BLOOD PRESSURE: 73 MMHG | HEART RATE: 82 BPM | SYSTOLIC BLOOD PRESSURE: 174 MMHG | BODY MASS INDEX: 23.04 KG/M2 | WEIGHT: 130 LBS

## 2023-06-22 DIAGNOSIS — K40.90 RIGHT INGUINAL HERNIA: ICD-10-CM

## 2023-06-22 DIAGNOSIS — R19.7 NAUSEA VOMITING AND DIARRHEA: Primary | ICD-10-CM

## 2023-06-22 DIAGNOSIS — R11.2 NAUSEA VOMITING AND DIARRHEA: Primary | ICD-10-CM

## 2023-06-22 DIAGNOSIS — A08.4 VIRAL GASTROENTERITIS: ICD-10-CM

## 2023-06-22 LAB
ALBUMIN SERPL-MCNC: 3.6 G/DL (ref 3.4–4.8)
ALBUMIN/GLOB SERPL: 1 RATIO (ref 1.1–2)
ALP SERPL-CCNC: 60 UNIT/L (ref 40–150)
ALT SERPL-CCNC: 14 UNIT/L (ref 0–55)
AST SERPL-CCNC: 23 UNIT/L (ref 5–34)
BASOPHILS # BLD AUTO: 0.03 X10(3)/MCL
BASOPHILS NFR BLD AUTO: 0.2 %
BILIRUBIN DIRECT+TOT PNL SERPL-MCNC: 0.7 MG/DL
BUN SERPL-MCNC: 13.3 MG/DL (ref 9.8–20.1)
CALCIUM SERPL-MCNC: 8.6 MG/DL (ref 8.4–10.2)
CHLORIDE SERPL-SCNC: 105 MMOL/L (ref 98–107)
CO2 SERPL-SCNC: 22 MMOL/L (ref 23–31)
CREAT SERPL-MCNC: 0.8 MG/DL (ref 0.55–1.02)
EOSINOPHIL # BLD AUTO: 0 X10(3)/MCL (ref 0–0.9)
EOSINOPHIL NFR BLD AUTO: 0 %
ERYTHROCYTE [DISTWIDTH] IN BLOOD BY AUTOMATED COUNT: 13.3 % (ref 11.5–17)
GFR SERPLBLD CREATININE-BSD FMLA CKD-EPI: >60 MLS/MIN/1.73/M2
GLOBULIN SER-MCNC: 3.6 GM/DL (ref 2.4–3.5)
GLUCOSE SERPL-MCNC: 114 MG/DL (ref 82–115)
HCT VFR BLD AUTO: 37.5 % (ref 37–47)
HGB BLD-MCNC: 12.3 G/DL (ref 12–16)
IMM GRANULOCYTES # BLD AUTO: 0.06 X10(3)/MCL (ref 0–0.04)
IMM GRANULOCYTES NFR BLD AUTO: 0.5 %
LYMPHOCYTES # BLD AUTO: 0.76 X10(3)/MCL (ref 0.6–4.6)
LYMPHOCYTES NFR BLD AUTO: 6.3 %
MCH RBC QN AUTO: 31.4 PG (ref 27–31)
MCHC RBC AUTO-ENTMCNC: 32.8 G/DL (ref 33–36)
MCV RBC AUTO: 95.7 FL (ref 80–94)
MONOCYTES # BLD AUTO: 0.73 X10(3)/MCL (ref 0.1–1.3)
MONOCYTES NFR BLD AUTO: 6.1 %
NEUTROPHILS # BLD AUTO: 10.48 X10(3)/MCL (ref 2.1–9.2)
NEUTROPHILS NFR BLD AUTO: 86.9 %
NRBC BLD AUTO-RTO: 0 %
PLATELET # BLD AUTO: 217 X10(3)/MCL (ref 130–400)
PMV BLD AUTO: 9.1 FL (ref 7.4–10.4)
POTASSIUM SERPL-SCNC: 3.8 MMOL/L (ref 3.5–5.1)
PROT SERPL-MCNC: 7.2 GM/DL (ref 5.8–7.6)
RBC # BLD AUTO: 3.92 X10(6)/MCL (ref 4.2–5.4)
SODIUM SERPL-SCNC: 137 MMOL/L (ref 136–145)
WBC # SPEC AUTO: 12.06 X10(3)/MCL (ref 4.5–11.5)

## 2023-06-22 PROCEDURE — 85025 COMPLETE CBC W/AUTO DIFF WBC: CPT | Performed by: EMERGENCY MEDICINE

## 2023-06-22 PROCEDURE — 80053 COMPREHEN METABOLIC PANEL: CPT | Performed by: EMERGENCY MEDICINE

## 2023-06-22 PROCEDURE — 63600175 PHARM REV CODE 636 W HCPCS: Performed by: EMERGENCY MEDICINE

## 2023-06-22 PROCEDURE — 96361 HYDRATE IV INFUSION ADD-ON: CPT

## 2023-06-22 PROCEDURE — 96374 THER/PROPH/DIAG INJ IV PUSH: CPT

## 2023-06-22 PROCEDURE — 99284 EMERGENCY DEPT VISIT MOD MDM: CPT | Mod: 25

## 2023-06-22 RX ORDER — ONDANSETRON 4 MG/1
4 TABLET, ORALLY DISINTEGRATING ORAL EVERY 6 HOURS PRN
Qty: 20 TABLET | Refills: 0 | Status: SHIPPED | OUTPATIENT
Start: 2023-06-22 | End: 2023-07-06

## 2023-06-22 RX ORDER — DIPHENOXYLATE HYDROCHLORIDE AND ATROPINE SULFATE 2.5; .025 MG/1; MG/1
1 TABLET ORAL 4 TIMES DAILY PRN
Qty: 20 TABLET | Refills: 0 | Status: SHIPPED | OUTPATIENT
Start: 2023-06-22 | End: 2023-07-02

## 2023-06-22 RX ORDER — FAMOTIDINE 20 MG/1
20 TABLET, FILM COATED ORAL NIGHTLY
Qty: 7 TABLET | Refills: 0 | Status: SHIPPED | OUTPATIENT
Start: 2023-06-22 | End: 2023-07-06

## 2023-06-22 RX ORDER — ONDANSETRON 2 MG/ML
4 INJECTION INTRAMUSCULAR; INTRAVENOUS
Status: COMPLETED | OUTPATIENT
Start: 2023-06-22 | End: 2023-06-22

## 2023-06-22 RX ADMIN — ONDANSETRON 4 MG: 2 INJECTION INTRAMUSCULAR; INTRAVENOUS at 08:06

## 2023-06-22 RX ADMIN — SODIUM CHLORIDE, POTASSIUM CHLORIDE, SODIUM LACTATE AND CALCIUM CHLORIDE 1000 ML: 600; 310; 30; 20 INJECTION, SOLUTION INTRAVENOUS at 08:06

## 2023-06-22 NOTE — ED PROVIDER NOTES
"Encounter Date: 6/22/2023    SCRIBE #1 NOTE: I, Rosalia Martinez, am scribing for, and in the presence of,  Milly Barron MD. I have scribed the following portions of the note - Other sections scribed: HPI, ROS, PE.     History     Chief Complaint   Patient presents with    Diarrhea     Pt reports per aasi with reports of N/D beginning yesterday. Seen yesterday here for same complaint, dx with hernia. Told to f/u with PCP. PCP told her that her hernia was "acting up". Has hernia repair sx cheduled for 0900 tomorrow morning. Denies pain. Temp 99.9 in triage.     72 year old female presents to the ED via EMS with complaints of n/v/d. Pt reports that she has not been able to keep anything down in two days. Pt was discharged from the hospital yesterday and she notes that she was not PO challenged prior to discharge. She says her diarrhea is black and tarry and she denies bloody emesis. She has a RLQ hernia and has an appointment with a surgeon tomorrow. She also complains of intermittent RLE pain.     The history is provided by the patient. No  was used.   Review of patient's allergies indicates:   Allergen Reactions    Sulfa (sulfonamide antibiotics)      "I dont know my Mom told me"     Past Medical History:   Diagnosis Date    Known health problems: none      Past Surgical History:   Procedure Laterality Date    none       History reviewed. No pertinent family history.  Social History     Tobacco Use    Smoking status: Never    Smokeless tobacco: Never   Substance Use Topics    Alcohol use: Not Currently    Drug use: Not Currently     Review of Systems   Constitutional:  Negative for chills, diaphoresis and fever.   HENT:  Negative for congestion, ear pain, sinus pain and sore throat.    Eyes:  Negative for pain, discharge and visual disturbance.   Respiratory:  Negative for cough, shortness of breath, wheezing and stridor.    Cardiovascular:  Negative for chest pain and palpitations. "   Gastrointestinal:  Positive for diarrhea, nausea and vomiting. Negative for abdominal pain, constipation and rectal pain.        Tarry stool    Genitourinary:  Negative for dysuria and hematuria.   Musculoskeletal:  Negative for back pain and myalgias.   Skin:  Negative for rash.   Neurological:  Negative for dizziness, syncope, numbness and headaches.   Hematological: Negative.    Psychiatric/Behavioral: Negative.     All other systems reviewed and are negative.    Physical Exam     Initial Vitals [06/22/23 0759]   BP Pulse Resp Temp SpO2   (!) 140/70 91 20 99.9 °F (37.7 °C) 98 %      MAP       --         Physical Exam    Nursing note and vitals reviewed.  Constitutional: She appears well-developed and well-nourished. She is not diaphoretic. No distress.   HENT:   Head: Normocephalic and atraumatic.   Nose: Nose normal.   Mouth/Throat: Oropharynx is clear and moist.   Eyes: Conjunctivae and EOM are normal. Pupils are equal, round, and reactive to light.   Neck: Trachea normal. Neck supple.   Normal range of motion.  Cardiovascular:  Normal rate, regular rhythm, normal heart sounds and intact distal pulses.           No murmur heard.  Pulmonary/Chest: Breath sounds normal. No respiratory distress. She has no wheezes. She has no rhonchi. She has no rales. She exhibits no tenderness.   Abdominal: Abdomen is soft. Bowel sounds are normal. She exhibits no distension and no mass. There is no abdominal tenderness.   Very easily reducible right inguinal hernia There is no rebound and no guarding.   Genitourinary:    Genitourinary Comments: Rectal exam: light brown stool, occult negative      Musculoskeletal:         General: No tenderness or edema. Normal range of motion.      Cervical back: Normal range of motion and neck supple.      Lumbar back: Normal. Normal range of motion.     Neurological: She is alert and oriented to person, place, and time. She has normal strength. No cranial nerve deficit or sensory deficit.    Skin: Skin is warm and dry. Capillary refill takes less than 2 seconds. No abscess noted. No erythema. No pallor.   Psychiatric: She has a normal mood and affect. Her behavior is normal. Judgment and thought content normal.       ED Course   Procedures  Labs Reviewed   COMPREHENSIVE METABOLIC PANEL - Abnormal; Notable for the following components:       Result Value    Carbon Dioxide 22 (*)     Globulin 3.6 (*)     Albumin/Globulin Ratio 1.0 (*)     All other components within normal limits   CBC WITH DIFFERENTIAL - Abnormal; Notable for the following components:    WBC 12.06 (*)     RBC 3.92 (*)     MCV 95.7 (*)     MCH 31.4 (*)     MCHC 32.8 (*)     Neut # 10.48 (*)     IG# 0.06 (*)     All other components within normal limits   CBC W/ AUTO DIFFERENTIAL    Narrative:     The following orders were created for panel order CBC auto differential.  Procedure                               Abnormality         Status                     ---------                               -----------         ------                     CBC with Differential[[id number]]        Abnormal            Final result                 Please view results for these tests on the individual orders.          Imaging Results    None          Medications   lactated ringers bolus 1,000 mL (0 mLs Intravenous Stopped 6/22/23 0923)   ondansetron injection 4 mg (4 mg Intravenous Given 6/22/23 0824)     Medical Decision Making:   History:   Old Medical Records: I decided to obtain old medical records.  Old Records Summarized: records from previous admission(s).  Initial Assessment:   See hpi  Clinical Tests:   Lab Tests: Ordered and Reviewed  Radiological Study: Reviewed        Scribe Attestation:   Scribe #1: I performed the above scribed service and the documentation accurately describes the services I performed. I attest to the accuracy of the note.  Comments: Attending:   Physician Attestation Statement for Scribe #1: I, Milly Barron MD,  personally performed the services described in this documentation. All medical record entries made by the scribe were at my direction and in my presence.  I have reviewed the chart and agree that the record reflects my personal performance and is accurate and complete.        Attending Attestation:           Physician Attestation for Scribe:  Physician Attestation Statement for Scribe #1: I, Milly Barron MD, reviewed documentation, as scribed by Rosalia Martinez in my presence, and it is both accurate and complete.       Medical Decision Making  Differential diagnosis includes but is not limited to dehydration, gastroenteritis  CBC and CMP ordered and reviewed and reviewed previous ED course.  She later states that she did not fill the Zofran prescription as she had originally stated.  She was given IV fluids and IV Zofran with improvement and p.o. challenged.  Her leukocytosis is improving and she was no ZACHARY but only mild metabolic acidosis likely due to her diarrhea.  We will give an antidiarrheal and ODT Zofran prescription and instructed to keep her follow-up appointment with surgery tomorrow as scheduled.  Her hernia is very easily reducible and almost nonpalpable when lying flat and there is no concern for any sort of obstruction.  She was very well-appearing and has no abdominal tenderness and is afebrile without any hemodynamic instability      Problems Addressed:  Nausea vomiting and diarrhea: acute illness or injury that poses a threat to life or bodily functions  Viral gastroenteritis: acute illness or injury that poses a threat to life or bodily functions    Amount and/or Complexity of Data Reviewed  External Data Reviewed: labs, radiology and notes.  Labs: ordered.    Risk  OTC drugs.  Prescription drug management.            ED Course as of 06/22/23 0946   Thu Jun 22, 2023   0858 Is feeling better and ready for po challenge [BS]      ED Course User Index  [BS] Milly Barron MD                  Clinical Impression:   Final diagnoses:  [R11.2, R19.7] Nausea vomiting and diarrhea (Primary)  [A08.4] Viral gastroenteritis  [K40.90] Right inguinal hernia        ED Disposition Condition    Discharge Stable          ED Prescriptions       Medication Sig Dispense Start Date End Date Auth. Provider    ondansetron (ZOFRAN-ODT) 4 MG TbDL Take 1 tablet (4 mg total) by mouth every 6 (six) hours as needed (nausea). 20 tablet 6/22/2023 -- Milly Barron MD    famotidine (PEPCID) 20 MG tablet Take 1 tablet (20 mg total) by mouth every evening. for 7 days 7 tablet 6/22/2023 6/29/2023 Milly Barron MD    diphenoxylate-atropine 2.5-0.025 mg (LOMOTIL) 2.5-0.025 mg per tablet Take 1 tablet by mouth 4 (four) times daily as needed for Diarrhea. 20 tablet 6/22/2023 7/2/2023 Milly Barron MD          Follow-up Information       Follow up With Specialties Details Why Contact Info    Franki Schilling MD General Surgery Go in 1 day  [address]  [address]  [facility] [address]  [phone]      [facility] - Emergency Dept Emergency Medicine  As needed, If symptoms worsen [address] [facility] [id number] [phone]             Milly Barron MD  06/22/23 2825

## 2023-06-22 NOTE — DISCHARGE INSTRUCTIONS
Stick to a clear liquid and bland diet and slowly advance it as you tolerate as you are feeling better.  Make sure you go to your appointment tomorrow morning as scheduled